# Patient Record
Sex: MALE | Race: WHITE | NOT HISPANIC OR LATINO | Employment: UNEMPLOYED | URBAN - METROPOLITAN AREA
[De-identification: names, ages, dates, MRNs, and addresses within clinical notes are randomized per-mention and may not be internally consistent; named-entity substitution may affect disease eponyms.]

---

## 2024-03-11 ENCOUNTER — PHARMACY VISIT (OUTPATIENT)
Dept: PHARMACY | Facility: MEDICAL CENTER | Age: 29
End: 2024-03-11
Payer: COMMERCIAL

## 2024-03-11 ENCOUNTER — HOSPITAL ENCOUNTER (EMERGENCY)
Facility: MEDICAL CENTER | Age: 29
End: 2024-03-11
Attending: EMERGENCY MEDICINE
Payer: MEDICAID

## 2024-03-11 VITALS
SYSTOLIC BLOOD PRESSURE: 137 MMHG | HEIGHT: 73 IN | DIASTOLIC BLOOD PRESSURE: 81 MMHG | HEART RATE: 71 BPM | RESPIRATION RATE: 16 BRPM | WEIGHT: 191.8 LBS | BODY MASS INDEX: 25.42 KG/M2 | TEMPERATURE: 97.5 F | OXYGEN SATURATION: 98 %

## 2024-03-11 DIAGNOSIS — H61.23 BILATERAL IMPACTED CERUMEN: ICD-10-CM

## 2024-03-11 DIAGNOSIS — H93.8X3 CONGESTION OF BOTH EARS: ICD-10-CM

## 2024-03-11 DIAGNOSIS — B34.9 VIRAL SYNDROME: ICD-10-CM

## 2024-03-11 DIAGNOSIS — U07.1 COVID: ICD-10-CM

## 2024-03-11 DIAGNOSIS — R05.1 ACUTE COUGH: ICD-10-CM

## 2024-03-11 LAB
FLUAV RNA SPEC QL NAA+PROBE: NEGATIVE
FLUBV RNA SPEC QL NAA+PROBE: NEGATIVE
RSV RNA SPEC QL NAA+PROBE: NEGATIVE
SARS-COV-2 RNA RESP QL NAA+PROBE: DETECTED

## 2024-03-11 PROCEDURE — A9270 NON-COVERED ITEM OR SERVICE: HCPCS | Mod: UD | Performed by: EMERGENCY MEDICINE

## 2024-03-11 PROCEDURE — 0241U HCHG SARS-COV-2 COVID-19 NFCT DS RESP RNA 4 TRGT ED POC: CPT

## 2024-03-11 PROCEDURE — 99283 EMERGENCY DEPT VISIT LOW MDM: CPT

## 2024-03-11 PROCEDURE — 69209 REMOVE IMPACTED EAR WAX UNI: CPT

## 2024-03-11 PROCEDURE — 700102 HCHG RX REV CODE 250 W/ 637 OVERRIDE(OP): Mod: UD | Performed by: EMERGENCY MEDICINE

## 2024-03-11 PROCEDURE — RXMED WILLOW AMBULATORY MEDICATION CHARGE: Performed by: EMERGENCY MEDICINE

## 2024-03-11 RX ORDER — BENZONATATE 200 MG/1
200 CAPSULE ORAL 3 TIMES DAILY PRN
Qty: 30 CAPSULE | Refills: 0 | Status: SHIPPED | OUTPATIENT
Start: 2024-03-11 | End: 2024-03-21

## 2024-03-11 RX ORDER — NAPROXEN 500 MG/1
500 TABLET ORAL ONCE
Status: COMPLETED | OUTPATIENT
Start: 2024-03-11 | End: 2024-03-11

## 2024-03-11 RX ORDER — NAPROXEN 500 MG/1
500 TABLET ORAL 2 TIMES DAILY WITH MEALS
Qty: 20 TABLET | Refills: 0 | Status: SHIPPED | OUTPATIENT
Start: 2024-03-11 | End: 2024-03-21

## 2024-03-11 RX ORDER — FLUOXETINE HYDROCHLORIDE 20 MG/1
20 CAPSULE ORAL DAILY
COMMUNITY

## 2024-03-11 RX ORDER — ACETAMINOPHEN 325 MG/1
650 TABLET ORAL EVERY 6 HOURS PRN
Qty: 30 TABLET | Refills: 0 | Status: SHIPPED | OUTPATIENT
Start: 2024-03-11

## 2024-03-11 RX ORDER — PSEUDOEPHEDRINE HCL 120 MG/1
120 TABLET, FILM COATED, EXTENDED RELEASE ORAL 2 TIMES DAILY PRN
Qty: 10 TABLET | Refills: 0 | Status: SHIPPED | OUTPATIENT
Start: 2024-03-11 | End: 2024-03-16

## 2024-03-11 RX ORDER — BENZONATATE 100 MG/1
200 CAPSULE ORAL ONCE
Status: COMPLETED | OUTPATIENT
Start: 2024-03-11 | End: 2024-03-11

## 2024-03-11 RX ADMIN — BENZONATATE 200 MG: 100 CAPSULE ORAL at 11:48

## 2024-03-11 RX ADMIN — NAPROXEN 500 MG: 500 TABLET ORAL at 11:48

## 2024-03-11 NOTE — ED TRIAGE NOTES
"Chief Complaint   Patient presents with    Flu Like Symptoms     Runny nose, fever, HA, body aches, R ear pain. S/sx since last night.     Ear Pain     Pt ambulatory to triage for above. Protocol ordered.     BP (!) 143/86   Pulse 78   Temp 36.6 °C (97.8 °F) (Oral)   Resp 16   Ht 1.854 m (6' 1\")   Wt 87 kg (191 lb 12.8 oz)   SpO2 98%   BMI 25.30 kg/m²     "

## 2024-03-21 ENCOUNTER — HOSPITAL ENCOUNTER (EMERGENCY)
Facility: MEDICAL CENTER | Age: 29
End: 2024-03-21
Attending: EMERGENCY MEDICINE
Payer: MEDICAID

## 2024-03-21 ENCOUNTER — PHARMACY VISIT (OUTPATIENT)
Dept: PHARMACY | Facility: MEDICAL CENTER | Age: 29
End: 2024-03-21
Payer: COMMERCIAL

## 2024-03-21 ENCOUNTER — APPOINTMENT (OUTPATIENT)
Dept: RADIOLOGY | Facility: MEDICAL CENTER | Age: 29
End: 2024-03-21
Attending: EMERGENCY MEDICINE
Payer: MEDICAID

## 2024-03-21 VITALS
HEART RATE: 85 BPM | WEIGHT: 192.9 LBS | SYSTOLIC BLOOD PRESSURE: 136 MMHG | RESPIRATION RATE: 19 BRPM | BODY MASS INDEX: 25.57 KG/M2 | HEIGHT: 73 IN | DIASTOLIC BLOOD PRESSURE: 87 MMHG | TEMPERATURE: 98.1 F | OXYGEN SATURATION: 95 %

## 2024-03-21 DIAGNOSIS — U07.1 COVID: ICD-10-CM

## 2024-03-21 PROCEDURE — 99284 EMERGENCY DEPT VISIT MOD MDM: CPT | Mod: EDC,25

## 2024-03-21 PROCEDURE — 0241U HCHG SARS-COV-2 COVID-19 NFCT DS RESP RNA 4 TRGT ED POC: CPT

## 2024-03-21 PROCEDURE — RXMED WILLOW AMBULATORY MEDICATION CHARGE: Performed by: EMERGENCY MEDICINE

## 2024-03-21 PROCEDURE — 71045 X-RAY EXAM CHEST 1 VIEW: CPT

## 2024-03-21 RX ORDER — GUAIFENESIN, PSEUDOEPHEDRINE HYDROCHLORIDE 600; 60 MG/1; MG/1
1 TABLET, EXTENDED RELEASE ORAL EVERY 12 HOURS
Qty: 60 TABLET | Refills: 0 | Status: SHIPPED | OUTPATIENT
Start: 2024-03-21

## 2024-03-21 RX ORDER — ALBUTEROL SULFATE 90 UG/1
2 AEROSOL, METERED RESPIRATORY (INHALATION) EVERY 6 HOURS PRN
Qty: 8.5 G | Refills: 0 | Status: SHIPPED | OUTPATIENT
Start: 2024-03-21 | End: 2024-03-21

## 2024-03-21 RX ORDER — ALBUTEROL SULFATE 90 UG/1
2 AEROSOL, METERED RESPIRATORY (INHALATION) EVERY 6 HOURS PRN
Qty: 8.5 G | Refills: 0 | Status: SHIPPED | OUTPATIENT
Start: 2024-03-21

## 2024-03-21 ASSESSMENT — FIBROSIS 4 INDEX: FIB4 SCORE: 0.41

## 2024-03-21 NOTE — ED NOTES
Discharge instructions and follow up care discussed with patient. Patient given time to ask questions, all questions addressed and patient verbalized understanding. Encouraged patient to return immediately for any concerning signs or symptoms. Patient given 2 new prescriptions. Patient AOx4 at discharge and ambulatory to lobby with steady gait.

## 2024-03-21 NOTE — ED NOTES
Patient roomed from [facility] to [place] [id number]. Patient reports positive covid test on 3/11 and wants to make sure he can go around his mother again, minimal symptoms of congestion, other wise feeling well.     Patient alert, skin PWDI, no increae WOB, lungs CTA.  Call light and TV remote introduced. In gown.   Droplet precautions in place.

## 2024-03-21 NOTE — ED PROVIDER NOTES
"ED Provider Note    CHIEF COMPLAINT  Chief Complaint   Patient presents with    Other     \"I just want a COVID test\" Patient was positive for COVID on 3/11, reports ongoing congestion.        EXTERNAL RECORDS REVIEWED  Outpatient Notes previous visits     HPI/ROS  LIMITATION TO HISTORY   None  OUTSIDE HISTORIAN(S):  None    Cliff Zhu is a 28 y.o. male who presents here for evaluation of \"COVID test.  Patient states that he recently last week, tested positive for COVID.  He came in here to make sure that he is current test was \"negative.\"  However he is still testing positive.  Patient has a mild cough, but no fever and chills.  He has no vomiting, chest pain, or abdominal pain.  He has no shortness of breath.    PAST MEDICAL HISTORY   No bleeding disorders    SURGICAL HISTORY  patient denies any surgical history    FAMILY HISTORY  History reviewed. No pertinent family history.    SOCIAL HISTORY  Social History     Tobacco Use    Smoking status: Never    Smokeless tobacco: Never   Vaping Use    Vaping Use: Never used   Substance and Sexual Activity    Alcohol use: Not Currently    Drug use: Not Currently    Sexual activity: Not on file       CURRENT MEDICATIONS  Home Medications    **Home medications have not yet been reviewed for this encounter**         ALLERGIES  No Known Allergies    PHYSICAL EXAM  VITAL SIGNS: BP (!) 136/91   Pulse 100   Temp 36.6 °C (97.8 °F) (Temporal)   Resp 18   Ht 1.854 m (6' 1\")   Wt 87.5 kg (192 lb 14.4 oz)   SpO2 96%   BMI 25.45 kg/m²    Constitutional: Well developed, well nourished. No acute distress.  HEENT: Normocephalic, atraumatic. Posterior pharynx clear and moist.  Eyes:  EOMI. Normal sclera.  Neck: Supple, Full range of motion, nontender.  Chest/Pulmonary: clear to ausculation. Symmetrical expansion.   Cardio: Regular rate and rhythm with no murmur.   Neuro: Clear speech, appropriate, cooperative, cranial nerves II-XII grossly intact.  Psych: Normal mood and " affect      DIAGNOSTIC STUDIES / PROCEDURES  Results for orders placed or performed during the hospital encounter of 03/21/24   POC CoV-2, FLU A/B, RSV by PCR   Result Value Ref Range    POC Influenza A RNA, PCR Negative Negative    POC Influenza B RNA, PCR Negative Negative    POC RSV, by PCR Negative Negative    POC SARS-CoV-2, PCR DETECTED (AA)          RADIOLOGY  DX-CHEST-LIMITED (1 VIEW)   Final Result      No acute cardiopulmonary disease.            COURSE & MEDICAL DECISION MAKING    Pt will be discharged in stable condition.     INITIAL ASSESSMENT, COURSE AND PLAN  Care Narrative: This is a 28-year-old male here for evaluation of COVID.  Patient has no fever chills or vomiting.  He did test positive for COVID here, but is mostly asymptomatic other than a mild cough.  Chest x-ray shows no acute finding.  At this time patient be discharged home in stable condition, and will have a repeat test in the next 10 days, because he is trying to visit his mom and does not want to give for COVID.    DISPOSITION AND DISCUSSIONS  I have discussed management of the patient with the following physicians and CASEY's:  none    Discussion of management with other QHP or appropriate source(s): none     Escalation of care considered, and ultimately not performed:no iv fluids indicated     Barriers to care at this time, including but not limited to: no primary care doctor .     Decision tools and prescription drugs considered including, but not limited to: albuterol, motrin. .    FINAL DIAGNOSIS  Covid        Electronically signed by: Wilfredo Jones D.O., 3/21/2024 2:38 PM

## 2024-03-21 NOTE — Clinical Note
Cliff Zhu was seen and treated in our emergency department on 3/21/2024.  He may return to work on 03/27/2024.       If you have any questions or concerns, please don't hesitate to call.      Wilfredo Jones D.O.

## 2024-03-21 NOTE — ED TRIAGE NOTES
".  Chief Complaint   Patient presents with    Other     \"I just want a COVID test\" Patient was positive for COVID on 3/11, reports ongoing congestion.        27 yo male ambulatory to triage for above complaint. COVID swabbed and processed.   Pt is alert and oriented, speaking in full sentences, follows commands and responds appropriately to questions.      Patient placed back in lobby and educated on triage process. Asked to inform RN of any changes or concerns.     BP (!) 136/91   Pulse 100   Temp 36.6 °C (97.8 °F) (Temporal)   Resp 18   Ht 1.854 m (6' 1\")   Wt 87.5 kg (192 lb 14.4 oz)   SpO2 96%   BMI 25.45 kg/m²     "

## 2024-04-06 ENCOUNTER — APPOINTMENT (OUTPATIENT)
Dept: RADIOLOGY | Facility: MEDICAL CENTER | Age: 29
End: 2024-04-06
Attending: EMERGENCY MEDICINE
Payer: MEDICAID

## 2024-04-06 ENCOUNTER — HOSPITAL ENCOUNTER (EMERGENCY)
Facility: MEDICAL CENTER | Age: 29
End: 2024-04-06
Attending: EMERGENCY MEDICINE
Payer: MEDICAID

## 2024-04-06 VITALS
WEIGHT: 194.89 LBS | RESPIRATION RATE: 15 BRPM | HEART RATE: 83 BPM | DIASTOLIC BLOOD PRESSURE: 86 MMHG | BODY MASS INDEX: 25.83 KG/M2 | TEMPERATURE: 97.6 F | HEIGHT: 73 IN | SYSTOLIC BLOOD PRESSURE: 138 MMHG | OXYGEN SATURATION: 94 %

## 2024-04-06 DIAGNOSIS — B34.9 VIRAL SYNDROME: ICD-10-CM

## 2024-04-06 DIAGNOSIS — J06.9 UPPER RESPIRATORY TRACT INFECTION, UNSPECIFIED TYPE: ICD-10-CM

## 2024-04-06 LAB
FLUAV RNA SPEC QL NAA+PROBE: NEGATIVE
FLUBV RNA SPEC QL NAA+PROBE: NEGATIVE
RSV RNA SPEC QL NAA+PROBE: NEGATIVE
SARS-COV-2 RNA RESP QL NAA+PROBE: NOTDETECTED

## 2024-04-06 PROCEDURE — 71045 X-RAY EXAM CHEST 1 VIEW: CPT

## 2024-04-06 PROCEDURE — 0241U HCHG SARS-COV-2 COVID-19 NFCT DS RESP RNA 4 TRGT ED POC: CPT

## 2024-04-06 PROCEDURE — A9270 NON-COVERED ITEM OR SERVICE: HCPCS | Mod: UD | Performed by: EMERGENCY MEDICINE

## 2024-04-06 PROCEDURE — 700102 HCHG RX REV CODE 250 W/ 637 OVERRIDE(OP): Mod: UD | Performed by: EMERGENCY MEDICINE

## 2024-04-06 PROCEDURE — 99284 EMERGENCY DEPT VISIT MOD MDM: CPT

## 2024-04-06 RX ORDER — GUAIFENESIN/DEXTROMETHORPHAN 100-10MG/5
5 SYRUP ORAL ONCE
Status: COMPLETED | OUTPATIENT
Start: 2024-04-06 | End: 2024-04-06

## 2024-04-06 RX ORDER — IBUPROFEN 800 MG/1
800 TABLET ORAL EVERY 8 HOURS PRN
Qty: 9 TABLET | Refills: 0 | Status: SHIPPED | OUTPATIENT
Start: 2024-04-06 | End: 2024-04-09

## 2024-04-06 RX ORDER — BENZONATATE 100 MG/1
100 CAPSULE ORAL ONCE
Status: COMPLETED | OUTPATIENT
Start: 2024-04-06 | End: 2024-04-06

## 2024-04-06 RX ORDER — BENZONATATE 100 MG/1
100 CAPSULE ORAL 3 TIMES DAILY PRN
Qty: 21 CAPSULE | Refills: 0 | Status: SHIPPED | OUTPATIENT
Start: 2024-04-06 | End: 2024-04-13

## 2024-04-06 RX ORDER — GUAIFENESIN 200 MG/10ML
10 LIQUID ORAL EVERY 4 HOURS PRN
Qty: 180 ML | Refills: 0 | Status: SHIPPED | OUTPATIENT
Start: 2024-04-06 | End: 2024-04-09

## 2024-04-06 RX ORDER — IBUPROFEN 600 MG/1
600 TABLET ORAL ONCE
Status: COMPLETED | OUTPATIENT
Start: 2024-04-06 | End: 2024-04-06

## 2024-04-06 RX ADMIN — IBUPROFEN 600 MG: 600 TABLET, FILM COATED ORAL at 16:03

## 2024-04-06 RX ADMIN — BENZONATATE 100 MG: 100 CAPSULE ORAL at 16:03

## 2024-04-06 RX ADMIN — GUAIFENESIN SYRUP AND DEXTROMETHORPHAN 5 ML: 100; 10 SYRUP ORAL at 16:03

## 2024-04-06 ASSESSMENT — FIBROSIS 4 INDEX: FIB4 SCORE: 0.41

## 2024-04-06 NOTE — ED NOTES
Pt discharged to lobby with steady gait. GCS 15. Pt in possession of belongings. Pt provided discharge education and information pertaining to medications and follow up appointments. Pt received copy of discharge instructions and verbalized understanding.     Vitals:    04/06/24 1646   BP:    Pulse: 83   Resp: 15   Temp: 36.4 °C (97.6 °F)   SpO2: 94%

## 2024-04-06 NOTE — ED PROVIDER NOTES
ED Provider Note    CHIEF COMPLAINT  Chief Complaint   Patient presents with    Generalized Body Aches    Cough     Productive cough    Nasal Congestion    Headache       EXTERNAL RECORDS REVIEWED  Inpatient Notes patient was admitted at [facility] February 17, 2024 through February 22, 2024 with Tylenol overdose and suicide attempt.    Patient was seen at [facility] on March 7, 2024 for suicidal ideation.  Patient is homeless.    Patient was seen here at [facility] on March 11, 2024 and was diagnosed with COVID.  He presented to ER here at [facility] on March 21 for repeat COVID test.    HPI/ROS  LIMITATION TO HISTORY   Select: : None  OUTSIDE HISTORIAN(S):  None    Cliff Zhu is a 28 y.o. male who presents to the ER complaining of cough, nasal congestion, headache, myalgias, and subjective fever which began on April 2.  Patient states symptoms initially began as myalgias.  Patient had COVID-19 in mid March.  He said he believes he recovered from his COVID completely and says that his symptoms are actually quite mild.  He took Tylenol this morning as well as some Mucinex.  Otherwise no other over-the-counter medications taken.  He is coughing up phlegm but no blood.  He says that the nasal discharge he is blowing out is bright green with occasional bits of blood in it.  No vomiting or diarrhea.  He did not get his flu shot this year.    PAST MEDICAL HISTORY   History mental health problems and GERD    SURGICAL HISTORY  patient denies any surgical history    FAMILY HISTORY  No family history on file.    SOCIAL HISTORY  Social History     Tobacco Use    Smoking status: Never    Smokeless tobacco: Never   Vaping Use    Vaping Use: Never used   Substance and Sexual Activity    Alcohol use: Not Currently    Drug use: Not Currently    Sexual activity: Not on file       CURRENT MEDICATIONS  Home Medications       Reviewed by Janie Vivas R.N. (Registered Nurse) on 04/06/24 at 4897   "Med List Status: Partial     Medication Last Dose Status   acetaminophen (TYLENOL) 325 MG Tab  Active   albuterol 108 (90 Base) MCG/ACT Aero Soln inhalation aerosol  Active   FLUoxetine (PROZAC) 20 MG Cap  Active   pseudoephedrine-guaifenesin (MUCINEX D)  MG per tablet  Active                    ALLERGIES  No Known Allergies    PHYSICAL EXAM  VITAL SIGNS: BP (!) 140/107   Pulse 100   Temp 37.2 °C (98.9 °F) (Oral)   Resp 18   Ht 1.854 m (6' 1\")   Wt 88.4 kg (194 lb 14.2 oz)   SpO2 96%   BMI 25.71 kg/m²    Constitutional:  Well developed, well nourished; coughing forcefully and loudly throughout H&P.  HENT: Normocephalic, Atraumatic, Bilateral external ears normal, mild erythema in the posterior oropharynx.  Moist mucous membranes  Eyes: PERRL, EOMI, Conjunctiva normal, No discharge.   Neck: Normal range of motion, supple, nontender  Lymphatic: No lymphadenopathy noted.   Cardiovascular: Normal heart rate, Normal rhythm, No murmurs, rubs or gallops   Thorax & Lungs: CTA=bilaterally;  No respiratory distress,  No wheezing rales, or rhonchi; No chest tenderness. No crepitus or subQ air  Abdomen:  soft, good bowel sounds no guarding no rebound, no masses, no pulsatile mass, no tenderness, no distention  Skin: Warm, Dry, No erythema, No rash.   Back: No tenderness, No CVA tenderness.   Extremities: 2+ dp and pt pulses bilateral LEs;  Nontender; no pretibial edema  Neurologic: Alert & oriented x 4, clear speech  Psychiatric: appropriate, normal affect       EKG/LABS    Results for orders placed or performed during the hospital encounter of 04/06/24   POC CoV-2, FLU A/B, RSV by PCR   Result Value Ref Range    POC Influenza A RNA, PCR Negative Negative    POC Influenza B RNA, PCR Negative Negative    POC RSV, by PCR Negative Negative    POC SARS-CoV-2, PCR NotDetected       RADIOLOGY  I have independently interpreted the diagnostic imaging associated with this visit and am waiting the final reading from the " radiologist.     My preliminary interpretation is as follows: Chest x-ray was reviewed by ER MD.  No obvious infiltrates.    Radiologist interpretation:  DX-CHEST-PORTABLE (1 VIEW)   Final Result      No acute cardiac or pulmonary abnormalities are identified.          COURSE & MEDICAL DECISION MAKING    ASSESSMENT, COURSE AND PLAN  Care Narrative: Patient presents to the ER complaining of myalgias, subjective fever, nasal congestion with green nasal discharge, headache, and a cough.  Patient took Tylenol earlier this morning.  He is afebrile here in the ER.  He is coughing quite a bit.  He is spitting out phlegm into the garbage can at bedside.  Cough sounds harsh and dry.  No nuchal rigidity.  No nausea or vomiting.  No concern for meningitis.  He is blowing out thick green nasal discharge with little bits of blood in it.  He has been sick for 4 days.  At this time I suspect he has viral syndrome/viral upper respiratory infection.  No need for antibiotics at this time.  Symptomatic treatment is recommended with over-the-counter Tylenol, ibuprofen, cough suppressants, lots of fluid and rest.  Patient is negative for COVID, flu and RSV.  I suspect he has another virus which is contributing to his symptoms.  No hemoptysis.  No chest pain.  He says the mucus makes it hard to breathe but otherwise no trouble breathing.  His O2 sats are 96% on room air.  Vitals are stable.  At this time I think he is safe and stable for outpatient management and discharged home.  He was given Tessalon, Robitussin and some Motrin here in the ER with some improvement.  Chest x-ray is negative.  No evidence for pneumonia.  He is to follow-up at [facility] the [facility] or with his primary care physician early this coming week.  Patient has been given strict return precautions and discharge instructions and he understands treatment plan and follow-up.          ADDITIONAL PROBLEMS MANAGED  Problem #1: Myalgias, headache, nasal  congestion with green nasal discharge, cough productive of phlegm, and subjective fever    DISPOSITION AND DISCUSSIONS  I have discussed management of the patient with the following physicians and CASEY's: None    Discussion of management with other QHP or appropriate source(s): None     Escalation of care considered, and ultimately not performed:Laboratory analysis.  Patient is afebrile here in the ER.  He last took Tylenol earlier this morning.  He does not appear to be septic or toxic.  No chest pain or hemoptysis.  No concern for cardiac or PE.  Symptoms are consistent with viral syndrome.  No need for blood work at this time.    Barriers to care at this time, including but not limited to: Patient does not have established PCP.  And patient is homeless    Decision tools and prescription drugs considered including, but not limited to: Antibiotics suspect viral syndrome.  No need for antibiotics at this time. .    FINAL DIAGNOSIS  1. Upper respiratory tract infection, unspecified type Acute   2. Viral syndrome Acute        This dictation has been created using voice recognition software. The accuracy of the dictation is limited by the abilities of the software. I expect there may be some errors of grammar and possibly content. I made every attempt to manually correct the errors within my dictation. However, errors related to voice recognition software may still exist and should be interpreted within the appropriate context.     Electronically signed by: Carmina Lee M.D., 4/6/2024 3:35 PM

## 2024-04-06 NOTE — ED NOTES
Pt ambulated to [facility] from [facility] with steady gait.  On monitor, call light in reach. Chart up for ERP.

## 2024-04-06 NOTE — ED TRIAGE NOTES
Chief Complaint   Patient presents with    Generalized Body Aches    Cough     Productive cough    Nasal Congestion    Headache     Has above all complaints. States symptoms started on Thursday. Also tested positive for covid on 3/21. Mask applied to pt. Has history of asthma. Able to speak in full sentences.  Vitals:    04/06/24 1510   BP: (!) 140/107   Pulse: 100   Resp: 18   Temp: 37.2 °C (98.9 °F)   SpO2: 96%

## 2024-04-06 NOTE — DISCHARGE INSTRUCTIONS
Drink plenty of fluids to stay well-hydrated.    Take over-the-counter Tylenol and ibuprofen as directed on the bottle for muscle aches/body aches and fever.    You may take over-the-counter Mucinex and Robitussin for cough.      Return to the ER for any worsening cough, changing cough, shortness of breath, coughing of rust colored phlegm or blood, chest pain, worsening headache, stiff neck, rash, vomiting, diarrhea, or for any concerns.

## 2024-06-09 NOTE — ED PROVIDER NOTES
"ED Provider Note    CHIEF COMPLAINT  Chief Complaint   Patient presents with    Flu Like Symptoms     Runny nose, fever, HA, body aches, R ear pain. S/sx since last night.     Ear Pain       EXTERNAL RECORDS REVIEWED  none    HPI/ROS  LIMITATION TO HISTORY   Select: : None  OUTSIDE HISTORIAN(S):  none    Cliff Zhu is a 28 y.o. male who presents to the emergency room for evaluation of recent body aches chills subjective fevers and right-sided ear pain.  These have escalated since last night he is currently staying in a shelter and feels like he has probably had multiple sick exposures.  He has had persistent nasal drip, cough congestion during that time.  He is here for viral testing    Remote history of childhood asthma, has not been reporting any wheezes, no nausea or vomiting, no diarrheal illness.    PAST MEDICAL HISTORY       SURGICAL HISTORY  patient denies any surgical history    FAMILY HISTORY  History reviewed. No pertinent family history.    SOCIAL HISTORY  Social History     Tobacco Use    Smoking status: Never    Smokeless tobacco: Never   Substance and Sexual Activity    Alcohol use: Not Currently    Drug use: Not Currently    Sexual activity: Not on file       CURRENT MEDICATIONS  Home Medications       Reviewed by Jas Montana R.N. (Registered Nurse) on 03/11/24 at 1050  Med List Status: Partial     Medication Last Dose Status   FLUoxetine (PROZAC) 20 MG Cap  Active                  ALLERGIES  No Known Allergies    PHYSICAL EXAM  VITAL SIGNS: BP (!) 143/86   Pulse 78   Temp 36.6 °C (97.8 °F) (Oral)   Resp 16   Ht 1.854 m (6' 1\")   Wt 87 kg (191 lb 12.8 oz)   SpO2 98%   BMI 25.30 kg/m²    Genl: M sitting in chair comfortably, speaking clearly, appears sick but non-toxic and in no acute distress   Head: NC/AT   ENT: Mucous membranes moist, posterior pharynx erythematous, no exudates, uvula midline, nares patent bilaterally.  Bilateral TMs are somewhat partially obstructed by copious " amounts of cerumen.  Left side has some erythematous changes without bulging.  Eyes: Normal sclera, pupils equal round reactive to light  Neck: Supple, FROM, subtle anterior bilateral LAD appreciated   Pulmonary: Lungs are clear to auscultation bilaterally, no wheezes  CV:  RRR, no murmur appreciated  Abdomen: soft, NT/ND; no rebound/guarding  Musculoskeletal: Pain free ROM of the neck. Moving upper and lower extremities in spontaneous and coordinated fashion  Neuro: A&Ox4 (person, place, time, situation), speech fluent, gait steady, no focal deficits appreciated  Skin: No rash or lesions.  No pallor or jaundice.  No cyanosis.  Warm and dry.     DIAGNOSTIC STUDIES / PROCEDURES    LABS  Labs Reviewed   POC COV-2, FLU A/B, RSV BY PCR - Abnormal; Notable for the following components:       Result Value    POC SARS-CoV-2, PCR DETECTED (*)     All other components within normal limits   POCT COV-2, FLU A/B, RSV BY PCR     COURSE & MEDICAL DECISION MAKING    ED Observation Status? No; Patient does not meet criteria for ED Observation.     INITIAL ASSESSMENT, COURSE AND PLAN  Care Narrative: Patient presents the emergency room for symptoms as described above.  The patient is alert, oriented, has had likely sick exposures in his living area and presents with several days of nonspecific cough, congestion and bilateral otalgia.  There is copious amounts of cerumen noted, this will be washed out and there is no subsequent findings of acute otitis media.  Provided with anti-inflammatory medications as he likely has some element of eustachian tube dysfunction following this viral pharyngitis.    There is no focal respiratory issues, no wheezing, no other acute head or neck tenderness and no rigidity or meningismus.  COVID and influenza testing had been obtained in triage.  This came back positive for COVID only.  Following irrigation of the bilateral eardrums he is somewhat improved but has some remnant wax and will be  prescribed Loprox.    Is handling his secretions well, he is able to be p.o. hydrated without difficulty.    At this time, patient is up for discharge home, symptomatic medications given for myalgias and overall URI-like symptomology.  Not have longstanding chronic medications or chronic illness that would necessitate Paxlovid.    DISPOSITION AND DISCUSSIONS  I have discussed management of the patient with the following physicians and CASEY's:  none    Discussion of management with other QHP or appropriate source(s): None     Escalation of care considered, and ultimately not performed:IV fluids and diagnostic imaging    Barriers to care at this time, including but not limited to: Patient does not have established PCP.     Decision tools and prescription drugs considered including, but not limited to: none.    FINAL DIAGNOSIS  1. Viral syndrome    2. Acute cough    3. Congestion of both ears    4. COVID    5. Bilateral impacted cerumen      Electronically signed by: Francisco Shelley M.D., 3/11/2024 11:10 AM   Yes - the patient is able to be screened

## 2024-09-24 ENCOUNTER — HOSPITAL ENCOUNTER (OUTPATIENT)
Dept: RADIOLOGY | Facility: MEDICAL CENTER | Age: 29
End: 2024-09-24
Attending: NURSE PRACTITIONER
Payer: MEDICAID

## 2024-09-24 DIAGNOSIS — R10.13 EPIGASTRIC PAIN: ICD-10-CM

## 2024-09-24 DIAGNOSIS — R11.0 NAUSEA: ICD-10-CM

## 2024-09-24 PROCEDURE — A9541 TC99M SULFUR COLLOID: HCPCS
